# Patient Record
(demographics unavailable — no encounter records)

---

## 2025-03-05 NOTE — REVIEW OF SYSTEMS
[Cough] : cough [Sputum] : sputum [Negative] : Endocrine [Dyspnea] : no dyspnea [Wheezing] : no wheezing [SOB on Exertion] : no sob on exertion

## 2025-03-05 NOTE — PROCEDURE
[FreeTextEntry1] : Chest PA and Lateral View  Reason:   Cough and asthma   Findings:  The retro-cardial and retero-sternal spaces are clear.  Both the lung fields are equally translucent.  The posterior and lateral costo-phrenic angles are clear.  No hilar or mediastinal mass is seen.  Domes of diaphragm are normal in position and contour.  The cardiac outline is normal.  No obvious skeletal abnormality is seen.  Impression:  No significant abnormality is seen.

## 2025-03-05 NOTE — ASSESSMENT
[FreeTextEntry1] : Intermittent asthma Continue as needed albuterol Spirometry today normal  Cough productive of clear phlegm likely secondary to postnasal drip Start Flonase in the morning and azelastine at night Trial for 1 month  History of allergic asthma, atopy, and seasonal allergies Check CBC with differential, IgE level, asthma profile  2 months follow-up

## 2025-03-05 NOTE — HISTORY OF PRESENT ILLNESS
[TextBox_4] : 24-year-old female with history of iron deficiency anemia, childhood asthma, presenting for evaluation of cough productive of clear phlegm, frequent clearing of the throat, postnasal drip, denies any wheezing or shortness of breath.  Lungs clear on exam today.  Most recent chest x-ray in 2024 was normal.  She has a history of allergic rhinitis, seasonal allergies, and atopy in the past.